# Patient Record
Sex: MALE | Race: OTHER | Employment: FULL TIME | URBAN - METROPOLITAN AREA
[De-identification: names, ages, dates, MRNs, and addresses within clinical notes are randomized per-mention and may not be internally consistent; named-entity substitution may affect disease eponyms.]

---

## 2023-03-09 ENCOUNTER — HOSPITAL ENCOUNTER (EMERGENCY)
Age: 38
Discharge: HOME | End: 2023-03-09
Attending: STUDENT IN AN ORGANIZED HEALTH CARE EDUCATION/TRAINING PROGRAM

## 2023-03-09 VITALS
SYSTOLIC BLOOD PRESSURE: 144 MMHG | HEART RATE: 65 BPM | OXYGEN SATURATION: 99 % | TEMPERATURE: 97.6 F | DIASTOLIC BLOOD PRESSURE: 74 MMHG | RESPIRATION RATE: 18 BRPM

## 2023-03-09 DIAGNOSIS — J01.01 ACUTE RECURRENT MAXILLARY SINUSITIS: Primary | ICD-10-CM

## 2023-03-09 PROCEDURE — 99283 EMERGENCY DEPT VISIT LOW MDM: CPT

## 2023-03-09 RX ORDER — AMOXICILLIN AND CLAVULANATE POTASSIUM 875; 125 MG/1; MG/1
1 TABLET, FILM COATED ORAL 2 TIMES DAILY
Qty: 20 TABLET | Refills: 0 | Status: SHIPPED | OUTPATIENT
Start: 2023-03-09 | End: 2023-03-19

## 2023-03-09 RX ORDER — PREDNISONE 20 MG/1
40 TABLET ORAL DAILY
Qty: 10 TABLET | Refills: 0 | Status: SHIPPED | OUTPATIENT
Start: 2023-03-09 | End: 2023-03-14

## 2023-03-09 RX ORDER — ACETAMINOPHEN AND CODEINE PHOSPHATE 300; 30 MG/1; MG/1
1 TABLET ORAL
Qty: 30 TABLET | Refills: 0 | Status: SHIPPED | OUTPATIENT
Start: 2023-03-09 | End: 2023-03-14

## 2023-03-09 NOTE — ED TRIAGE NOTES
TRIAGE NOTE:  Patient arrives ambulatory with c/o pain to forehead, and jaw that started on Sunday.    Patient reports that it feels like the sinusitis he had last time

## 2023-03-09 NOTE — Clinical Note
[address]  [address] [id number]  [phone]    Work/School Note    Date: 3/9/2023    To Whom It May concern:    Gerber Yepez was seen and treated today in the emergency room by the following provider(s):  Attending Provider: Deisy Gibson MD  Nurse Practitioner: Armenta Siemens, NP. Gerber Yepez is excused from work/school on 3/9/2023 through 3/11/2023. He is medically clear to return to work/school on 3/12/2023.          Sincerely,          Mike Roberts NP

## 2023-03-09 NOTE — ED PROVIDER NOTES
Facial Pain   Associated symptoms include headaches. Pertinent negatives include no chest pain, no visual disturbance, no abdominal pain, no nausea, no vomiting and no cough. Patient is a 24-year-old  male with past medical history significant for a closed maxilla fracture that occurred approximately 2 years ago in [place]. Since then he has had recurrent sinusitis as diagnosed by an ENT from [facility] the past week he had had intermittent tactile fever, thick nasal drainage, maxillary facial tenderness and headache. Denies any difficulty breathing, difficulty swallowing, SOB or chest pain. Denies neck pain, visual changes, focal weakness or rash. Denies any nausea, vomiting or diarrhea. Pt. Reports taking leftover diclofenac tablets for his headache with minimal relief. He is being a prescription for Augmentin and has his paperwork from [facility] with him. [employer] line employed. No past medical history on file. No past surgical history on file. No family history on file. Social History     Socioeconomic History    Marital status: Not on file     Spouse name: Not on file    Number of children: Not on file    Years of education: Not on file    Highest education level: Not on file   Occupational History    Not on file   Tobacco Use    Smoking status: Not on file    Smokeless tobacco: Not on file   Substance and Sexual Activity    Alcohol use: Not on file    Drug use: Not on file    Sexual activity: Not on file   Other Topics Concern    Not on file   Social History Narrative    Not on file     Social Determinants of Health     Financial Resource Strain: Not on file   Food Insecurity: Not on file   Transportation Needs: Not on file   Physical Activity: Not on file   Stress: Not on file   Social Connections: Not on file   Intimate Partner Violence: Not on file   Housing Stability: Not on file         ALLERGIES: Patient has no known allergies.     Review of Systems   Constitutional:  Positive for fever. Negative for activity change, appetite change and unexpected weight change. HENT:  Positive for congestion, sinus pressure and sinus pain. Eyes:  Negative for visual disturbance. Respiratory:  Negative for cough and shortness of breath. Cardiovascular:  Negative for chest pain, palpitations and leg swelling. Gastrointestinal:  Negative for abdominal pain, nausea and vomiting. Genitourinary:  Negative for dysuria and flank pain. Musculoskeletal:  Negative for back pain, joint swelling and myalgias. Neurological:  Positive for headaches. All other systems reviewed and are negative. Vitals:    03/09/23 1835   BP: (!) 144/74   Pulse: 65   Resp: 18   Temp: 97.6 °F (36.4 °C)   SpO2: 99%            Physical Exam  Vitals reviewed. Constitutional:       General: He is not in acute distress. Appearance: Normal appearance. He is normal weight. He is not ill-appearing, toxic-appearing or diaphoretic. Comments:  male; smoker;    HENT:      Head: Normocephalic. Comments: Maxillary and frontal tenderness with palpation; no obvious facial swelling or erythema     Right Ear: Tympanic membrane normal.      Left Ear: Tympanic membrane normal.      Nose: Rhinorrhea present. Mouth/Throat:      Mouth: Mucous membranes are moist.      Pharynx: No posterior oropharyngeal erythema. Cardiovascular:      Rate and Rhythm: Normal rate and regular rhythm. Pulmonary:      Effort: Pulmonary effort is normal.      Breath sounds: Normal breath sounds. Musculoskeletal:         General: Normal range of motion. Cervical back: Normal range of motion and neck supple. Skin:     General: Skin is warm and dry. Findings: No bruising, erythema or rash. Neurological:      Mental Status: He is alert and oriented to person, place, and time. Medical Decision Making  Risk  Prescription drug management. Procedures    Pt has his previous documentation of close maxilla fracture and ENT documentation from [facility] regarding recurrent sinusitis and preferred treatment with Augmentin. Plan to prescribe Augmentin for treatment of his acute sinusitis and give referral for ENT follow up here in [place]. 7:05 PM  Patient's results  and plan of care have been reviewed with him. Patient and/or family have verbally conveyed their understanding and agreement of the patient's signs, symptoms, diagnosis, treatment and prognosis and additionally agree to follow up as recommended or return to the Emergency Room should his condition change prior to follow-up. Discharge instructions have also been provided to the patient with some educational information regarding his diagnosis as well a list of reasons why he would want to return to the ER prior to his follow-up appointment should his condition change. Marek Abbott NP